# Patient Record
Sex: FEMALE | Race: WHITE | NOT HISPANIC OR LATINO | Employment: FULL TIME | URBAN - METROPOLITAN AREA
[De-identification: names, ages, dates, MRNs, and addresses within clinical notes are randomized per-mention and may not be internally consistent; named-entity substitution may affect disease eponyms.]

---
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
## 2019-05-14 NOTE — PATIENT INSTRUCTIONS
Birth control instructions:    1) Take at the same time every day.    2) The more times you miss a pill, the less effective the pill with be for contraception and managing periods.    3)  The pill will become effective at preventing pregnancy after 7 days of use.  Use a back up condom until then.    4)  Birth control does not protect you against sexually transmitted infections (STDS or STIS) so always use protection if not in a monogamous relationship.    5) Risks/side effects can include blood clots, high blood pressure, headaches, nausea (try taking with food if this occurs).  Do not smoke with this as this increases your blood clot risk.  If you become pregnant, stop taking the pill right away.    6) The first 3 months of being on birth control, you may have irregular periods or spotting.  Typically this will even out after your body adjusts.  If you have any questions or concerns, let me know.         Preventive Health Recommendations  Female Ages 18 to 20     Yearly exam:     See your health care provider every year in order to  o Review health changes.   o Discuss preventive care.    o Review your medicines if your doctor has prescribed any.      You should be tested each year for STDs (sexually transmitted diseases).       After age 20, talk to your provider about how often you should have cholesterol testing.      If you are at risk for diabetes, you should have a diabetes test (fasting glucose).     Shots:     Get a flu shot each year.     Get a tetanus shot every 10 years.     Consider getting the shot (vaccine) that prevents cervical cancer (Gardasil).    Nutrition:     Eat at least 5 servings of fruits and vegetables each day.    Eat whole-grain bread, whole-wheat pasta and brown rice instead of white grains and rice.    Get adequate Calcium and Vitamin D.     Lifestyle    Exercise at least 150 minutes a week each week (30 minutes a day, 5 days a week). This will help you control your weight and prevent  disease.    No smoking.     Wear sunscreen to prevent skin cancer.    See your dentist every six months for an exam and cleaning.

## 2019-05-14 NOTE — PROGRESS NOTES
SUBJECTIVE:   CC: Flaca Corona is an 18 year old woman who presents for preventive health visit.     Healthy Habits:    Answers for HPI/ROS submitted by the patient on 5/23/2019   Annual Exam:  Frequency of exercise:: 4-5 days/week  Getting at least 3 servings of Calcium per day:: Yes  Diet:: Regular (no restrictions)  Taking medications regularly:: Yes  Medication side effects:: None  Bi-annual eye exam:: Yes  Dental care twice a year:: Yes  Sleep apnea or symptoms of sleep apnea:: None  abdominal pain: No  Blood in stool: No  Blood in urine: No  chest pain: No  chills: No  congestion: No  constipation: No  cough: No  diarrhea: No  dizziness: No  ear pain: No  eye pain: No  nervous/anxious: No  fever: No  frequency: No  genital sores: No  headaches: No  hearing loss: No  heartburn: No  arthralgias: No  joint swelling: No  peripheral edema: No  mood changes: No  myalgias: No  nausea: No  dysuria: No  palpitations: No  Skin sensation changes: No  sore throat: No  urgency: No  rash: No  shortness of breath: No  visual disturbance: No  weakness: No  pelvic pain: No  vaginal bleeding: No  vaginal discharge: No  tenderness: No  breast mass: No  breast discharge: No  Additional concerns today:: No  Duration of exercise:: 45-60 minutes    Discuss getting on a BC med per pt have not been on meds in the past, looking more for pill version or form.  Has irregular periods.  Not too heavy or much cramping.   Not currently sexually active. Has been in the past. Due for std screening. Due for pap in the fall.     SH-nonsmoker. Going to be an . Working for the summer at a dance studio.       Today's PHQ-2 Score:   PHQ-2 ( 1999 Pfizer) 5/23/2019   Q1: Little interest or pleasure in doing things 0   Q2: Feeling down, depressed or hopeless 0   PHQ-2 Score 0   Q1: Little interest or pleasure in doing things Not at all   Q2: Feeling down, depressed or hopeless Not at all   PHQ-2 Score 0       Abuse: Current or  Past(Physical, Sexual or Emotional)- No  Do you feel safe in your environment? Yes    Social History     Tobacco Use     Smoking status: Never Smoker     Smokeless tobacco: Never Used   Substance Use Topics     Alcohol use: No     If you drink alcohol do you typically have >3 drinks per day or >7 drinks per week? No                     Reviewed orders with patient.  Reviewed health maintenance and updated orders accordingly - Yes  Lab work is in process  Labs reviewed in EPIC  BP Readings from Last 3 Encounters:   05/23/19 130/78   03/30/17 127/72   06/15/11 116/70 (92 %/ 79 %)*     *BP percentiles are based on the August 2017 AAP Clinical Practice Guideline for girls    Wt Readings from Last 3 Encounters:   05/23/19 49.4 kg (109 lb)   03/30/17 49.4 kg (109 lb) (17 %)*   06/15/11 29.9 kg (66 lb) (<1 %)*     * Growth percentiles are based on [facility] (Girls, 2-20 Years) data.                  Patient Active Problem List   Diagnosis     Left knee pain     Sprain of MCL (medial collateral ligament) of knee     Patellofemoral joint pain, left     History reviewed. No pertinent surgical history.    Social History     Tobacco Use     Smoking status: Never Smoker     Smokeless tobacco: Never Used   Substance Use Topics     Alcohol use: No     Family History   Problem Relation Age of Onset     Asthma Paternal Grandmother          Current Outpatient Medications   Medication Sig Dispense Refill     IBUPROFEN PO Take 200 mg by mouth       NO ACTIVE MEDICATIONS Reported on 3/30/2017       norethindrone-ethinyl estradiol (JUNEL FE 1/20) 1-20 MG-MCG tablet Take 1 tablet by mouth daily 28 tablet 11       Mammogram not appropriate for this patient based on age.    Pertinent mammograms are reviewed under the imaging tab.  History of abnormal Pap smear: NO - under age 21, PAP not appropriate for age     Reviewed and updated as needed this visit by clinical staff  Tobacco  Allergies  Meds  Med Hx  Surg Hx  Fam Hx  Soc Hx     "    Reviewed and updated as needed this visit by Provider        History reviewed. No pertinent past medical history.   History reviewed. No pertinent surgical history.  OB History   No data available       ROS:  CONSTITUTIONAL: NEGATIVE for fever, chills, change in weight  INTEGUMENTARU/SKIN: NEGATIVE for worrisome rashes, moles or lesions  EYES: NEGATIVE for vision changes or irritation  ENT: NEGATIVE for ear, mouth and throat problems  RESP: NEGATIVE for significant cough or SOB  BREAST: NEGATIVE for masses, tenderness or discharge  CV: NEGATIVE for chest pain, palpitations or peripheral edema  GI: NEGATIVE for nausea, abdominal pain, heartburn, or change in bowel habits  MUSCULOSKELETAL: NEGATIVE for significant arthralgias or myalgia  NEURO: NEGATIVE for weakness, dizziness or paresthesias  PSYCHIATRIC: NEGATIVE for changes in mood or affect    OBJECTIVE:   /78   Pulse 79   Temp 97.8  F (36.6  C) (Oral)   Resp 14   Ht 1.549 m (5' 1\")   Wt 49.4 kg (109 lb)   LMP 03/28/2019 (Approximate)   SpO2 99%   Breastfeeding? No   BMI 20.60 kg/m    EXAM:  GENERAL: healthy, alert and no distress  EYES: Eyes grossly normal to inspection, PERRL and conjunctivae and sclerae normal  HENT: ear canals and TM's normal, nose and mouth without ulcers or lesions  NECK: no adenopathy, no asymmetry, masses, or scars and thyroid normal to palpation  RESP: lungs clear to auscultation - no rales, rhonchi or wheezes  BREAST: normal without masses, tenderness or nipple discharge and no palpable axillary masses or adenopathy  CV: regular rate and rhythm, normal S1 S2, no S3 or S4, no murmur, click or rub, no peripheral edema and peripheral pulses strong  ABDOMEN: soft, nontender, no hepatosplenomegaly, no masses and bowel sounds normal  MS: no gross musculoskeletal defects noted, no edema  SKIN: no suspicious lesions or rashes  NEURO: Normal strength and tone, mentation intact and speech normal  PSYCH: mentation appears normal, " "affect normal/bright        ASSESSMENT/PLAN:   1. Screen for STD (sexually transmitted disease)    - Chlamydia trachomatis PCR    2. Irregular periods    - norethindrone-ethinyl estradiol (JUNEL FE 1/20) 1-20 MG-MCG tablet; Take 1 tablet by mouth daily  Dispense: 28 tablet; Refill: 11    3. Encounter for routine adult health examination without abnormal findings        COUNSELING:   Reviewed preventive health counseling, as reflected in patient instructions       Regular exercise       Healthy diet/nutrition       Vision screening       Hearing screening    Estimated body mass index is 20.6 kg/m  as calculated from the following:    Height as of this encounter: 1.549 m (5' 1\").    Weight as of this encounter: 49.4 kg (109 lb).         reports that she has never smoked. She has never used smokeless tobacco.     Patient Instructions   Birth control instructions:    1) Take at the same time every day.    2) The more times you miss a pill, the less effective the pill with be for contraception and managing periods.    3)  The pill will become effective at preventing pregnancy after 7 days of use.  Use a back up condom until then.    4)  Birth control does not protect you against sexually transmitted infections (STDS or STIS) so always use protection if not in a monogamous relationship.    5) Risks/side effects can include blood clots, high blood pressure, headaches, nausea (try taking with food if this occurs).  Do not smoke with this as this increases your blood clot risk.  If you become pregnant, stop taking the pill right away.    6) The first 3 months of being on birth control, you may have irregular periods or spotting.  Typically this will even out after your body adjusts.  If you have any questions or concerns, let me know.         Preventive Health Recommendations  Female Ages 18 to 20     Yearly exam:     See your health care provider every year in order to  o Review health changes.   o Discuss preventive care.  "   o Review your medicines if your doctor has prescribed any.      You should be tested each year for STDs (sexually transmitted diseases).       After age 20, talk to your provider about how often you should have cholesterol testing.      If you are at risk for diabetes, you should have a diabetes test (fasting glucose).     Shots:     Get a flu shot each year.     Get a tetanus shot every 10 years.     Consider getting the shot (vaccine) that prevents cervical cancer (Gardasil).    Nutrition:     Eat at least 5 servings of fruits and vegetables each day.    Eat whole-grain bread, whole-wheat pasta and brown rice instead of white grains and rice.    Get adequate Calcium and Vitamin D.     Lifestyle    Exercise at least 150 minutes a week each week (30 minutes a day, 5 days a week). This will help you control your weight and prevent disease.    No smoking.     Wear sunscreen to prevent skin cancer.    See your dentist every six months for an exam and cleaning.          Counseling Resources:  ATP IV Guidelines  Pooled Cohorts Equation Calculator  Breast Cancer Risk Calculator  FRAX Risk Assessment  ICSI Preventive Guidelines  Dietary Guidelines for Americans, 2010  USDA's MyPlate  ASA Prophylaxis  Lung CA Screening    Sarah Johnson PA-C  [facility]

## 2019-05-23 ENCOUNTER — OFFICE VISIT (OUTPATIENT)
Dept: FAMILY MEDICINE | Facility: CLINIC | Age: 19
End: 2019-05-23
Payer: COMMERCIAL

## 2019-05-23 VITALS
TEMPERATURE: 97.8 F | RESPIRATION RATE: 14 BRPM | WEIGHT: 109 LBS | SYSTOLIC BLOOD PRESSURE: 130 MMHG | BODY MASS INDEX: 20.58 KG/M2 | OXYGEN SATURATION: 99 % | HEART RATE: 79 BPM | HEIGHT: 61 IN | DIASTOLIC BLOOD PRESSURE: 78 MMHG

## 2019-05-23 DIAGNOSIS — Z11.3 SCREEN FOR STD (SEXUALLY TRANSMITTED DISEASE): ICD-10-CM

## 2019-05-23 DIAGNOSIS — Z00.00 ENCOUNTER FOR ROUTINE ADULT HEALTH EXAMINATION WITHOUT ABNORMAL FINDINGS: Primary | ICD-10-CM

## 2019-05-23 DIAGNOSIS — N92.6 IRREGULAR PERIODS: ICD-10-CM

## 2019-05-23 PROCEDURE — 99395 PREV VISIT EST AGE 18-39: CPT | Performed by: PHYSICIAN ASSISTANT

## 2019-05-23 PROCEDURE — 87491 CHLMYD TRACH DNA AMP PROBE: CPT | Performed by: PHYSICIAN ASSISTANT

## 2019-05-23 RX ORDER — NORETHINDRONE ACETATE AND ETHINYL ESTRADIOL 1MG-20(21)
1 KIT ORAL DAILY
Qty: 28 TABLET | Refills: 11 | Status: SHIPPED | OUTPATIENT
Start: 2019-05-23 | End: 2020-03-30

## 2019-05-23 ASSESSMENT — ENCOUNTER SYMPTOMS
NERVOUS/ANXIOUS: 0
COUGH: 0
HEMATURIA: 0
DIZZINESS: 0
MYALGIAS: 0
HEMATOCHEZIA: 0
WEAKNESS: 0
DYSURIA: 0
HEADACHES: 0
DIARRHEA: 0
PALPITATIONS: 0
SHORTNESS OF BREATH: 0
CHILLS: 0
SORE THROAT: 0
BREAST MASS: 0
CONSTIPATION: 0
PARESTHESIAS: 0
FEVER: 0
HEARTBURN: 0
ARTHRALGIAS: 0
EYE PAIN: 0
JOINT SWELLING: 0
FREQUENCY: 0
NAUSEA: 0
ABDOMINAL PAIN: 0

## 2019-05-23 ASSESSMENT — MIFFLIN-ST. JEOR: SCORE: 1201.8

## 2019-05-23 NOTE — LETTER
May 28, 2019    Flaca Corona  [address] [facility] [address]        Dear Flaca,    It was a pleasure to see you at your recent office visit.  Your test results are listed below. Negative chlamydia test.         If you have any questions or concerns, please call the clinic at [phone].    Sincerely,  Sarah Johnson PA-C    Results for orders placed or performed in visit on 05/23/19   Chlamydia trachomatis PCR   Result Value Ref Range    Specimen Description Urine     Chlamydia Trachomatis PCR Negative NEG^Negative

## 2019-05-24 LAB
C TRACH DNA SPEC QL NAA+PROBE: NEGATIVE
SPECIMEN SOURCE: NORMAL

## 2019-05-27 NOTE — RESULT ENCOUNTER NOTE
Dear Flaca,      It was a pleasure to see you at your recent office visit.  Your test results are listed below. Negative chlamydia test.         If you have any questions or concerns, please call the clinic at [phone].    Sincerely,  Sarah Johnson PA-C

## 2020-03-30 ENCOUNTER — MYC REFILL (OUTPATIENT)
Dept: FAMILY MEDICINE | Facility: CLINIC | Age: 20
End: 2020-03-30

## 2020-03-30 DIAGNOSIS — N92.6 IRREGULAR PERIODS: ICD-10-CM

## 2020-03-31 RX ORDER — NORETHINDRONE ACETATE AND ETHINYL ESTRADIOL 1MG-20(21)
1 KIT ORAL DAILY
Qty: 84 TABLET | Refills: 0 | Status: SHIPPED | OUTPATIENT
Start: 2020-03-31 | End: 2020-07-06

## 2020-03-31 NOTE — TELEPHONE ENCOUNTER
"Requested Prescriptions   Signed Prescriptions Disp Refills    norethindrone-ethinyl estradiol (JUNEL FE 1/20) 1-20 MG-MCG tablet 84 tablet 0     Sig: Take 1 tablet by mouth daily       Contraceptives Protocol Passed - 3/30/2020 11:47 PM        Passed - Patient is not a current smoker if age is 35 or older        Passed - Recent (12 mo) or future (30 days) visit within the authorizing provider's specialty     Patient has had an office visit with the authorizing provider or a provider within the authorizing providers department within the previous 12 mos or has a future within next 30 days. See \"Patient Info\" tab in inbasket, or \"Choose Columns\" in Meds & Orders section of the refill encounter.              Passed - Medication is active on med list        Passed - No active pregnancy on record        Passed - No positive pregnancy test in past 12 months                 "

## 2020-07-02 NOTE — PROGRESS NOTES
"Subjective     Flaca Corona is a 19 year old female who presents to clinic today for the following health issues:    HPI     PAP and refill BC medication.  Due for first pap and std screening also due.   Takes junel for birth control. Periods regular. No side effects. No concerns.   Has had a breast exam before. No concerns.   Has been sexually active in the past.   No vaginal discharge.            Patient Active Problem List   Diagnosis     Left knee pain     Sprain of MCL (medial collateral ligament) of knee     Patellofemoral joint pain, left     No past surgical history on file.    Social History     Tobacco Use     Smoking status: Never Smoker     Smokeless tobacco: Never Used   Substance Use Topics     Alcohol use: No     Family History   Problem Relation Age of Onset     Asthma Paternal Grandmother          Current Outpatient Medications   Medication Sig Dispense Refill     norethindrone-ethinyl estradiol (JUNEL FE 1/20) 1-20 MG-MCG tablet Take 1 tablet by mouth daily 84 tablet 0     IBUPROFEN PO Take 200 mg by mouth       NO ACTIVE MEDICATIONS Reported on 3/30/2017       No Known Allergies      Reviewed and updated as needed this visit by Provider         Review of Systems   Constitutional, HEENT, cardiovascular, pulmonary, GI, , musculoskeletal, neuro, skin, endocrine and psych systems are negative, except as otherwise noted.      Objective    /70   Pulse 106   Temp 99.1  F (37.3  C) (Tympanic)   Ht 1.549 m (5' 1\")   Wt 48.1 kg (106 lb)   LMP 06/17/2020 (Exact Date)   SpO2 99%   BMI 20.03 kg/m    Body mass index is 20.03 kg/m .  Physical Exam   GENERAL: healthy, alert and no distress  NECK: no adenopathy, no asymmetry, masses, or scars and thyroid normal to palpation  RESP: lungs clear to auscultation - no rales, rhonchi or wheezes  BREAST: normal without masses, tenderness or nipple discharge and no palpable axillary masses or adenopathy  CV: regular rate and rhythm, normal S1 S2, no S3 " or S4, no murmur, click or rub, no peripheral edema and peripheral pulses strong   (female): normal female external genitalia, normal urethral meatus, vaginal mucosa, normal cervix/adnexa/uterus without masses or discharge  MS: no gross musculoskeletal defects noted, no edema  NEURO: Normal strength and tone, mentation intact and speech normal  PSYCH: mentation appears normal, affect normal/bright    Diagnostic Test Results:  Labs reviewed in Epic        Assessment & Plan     1. Screening for malignant neoplasm of cervix    - Pap imaged thin layer screen only - recommended age 21 - 24 years    2. Screening for STDs (sexually transmitted diseases)    - NEISSERIA GONORRHOEA PCR  - CHLAMYDIA TRACHOMATIS PCR    3. Encounter for surveillance of contraceptive pills      4. Irregular periods  Doing well    - norethindrone-ethinyl estradiol (JUNEL FE 1/20) 1-20 MG-MCG tablet; Take 1 tablet by mouth daily  Dispense: 84 tablet; Refill: 3           Return in about 1 year (around 7/6/2021) for Physical Exam, fasting Lab Work.    Sarah Johnson PA-C  [facility]

## 2020-07-06 ENCOUNTER — OFFICE VISIT (OUTPATIENT)
Dept: FAMILY MEDICINE | Facility: CLINIC | Age: 20
End: 2020-07-06
Payer: COMMERCIAL

## 2020-07-06 VITALS
OXYGEN SATURATION: 99 % | BODY MASS INDEX: 20.01 KG/M2 | HEIGHT: 61 IN | TEMPERATURE: 99.1 F | HEART RATE: 95 BPM | DIASTOLIC BLOOD PRESSURE: 70 MMHG | WEIGHT: 106 LBS | SYSTOLIC BLOOD PRESSURE: 120 MMHG

## 2020-07-06 DIAGNOSIS — Z30.41 ENCOUNTER FOR SURVEILLANCE OF CONTRACEPTIVE PILLS: ICD-10-CM

## 2020-07-06 DIAGNOSIS — Z11.3 SCREENING FOR STDS (SEXUALLY TRANSMITTED DISEASES): ICD-10-CM

## 2020-07-06 DIAGNOSIS — N92.6 IRREGULAR PERIODS: ICD-10-CM

## 2020-07-06 DIAGNOSIS — Z12.4 SCREENING FOR MALIGNANT NEOPLASM OF CERVIX: Primary | ICD-10-CM

## 2020-07-06 PROCEDURE — 87591 N.GONORRHOEAE DNA AMP PROB: CPT | Performed by: PHYSICIAN ASSISTANT

## 2020-07-06 PROCEDURE — 87491 CHLMYD TRACH DNA AMP PROBE: CPT | Performed by: PHYSICIAN ASSISTANT

## 2020-07-06 PROCEDURE — G0145 SCR C/V CYTO,THINLAYER,RESCR: HCPCS | Performed by: PHYSICIAN ASSISTANT

## 2020-07-06 PROCEDURE — 99214 OFFICE O/P EST MOD 30 MIN: CPT | Performed by: PHYSICIAN ASSISTANT

## 2020-07-06 RX ORDER — NORETHINDRONE ACETATE AND ETHINYL ESTRADIOL 1MG-20(21)
1 KIT ORAL DAILY
Qty: 84 TABLET | Refills: 3 | Status: SHIPPED | OUTPATIENT
Start: 2020-07-06 | End: 2021-07-12

## 2020-07-06 ASSESSMENT — MIFFLIN-ST. JEOR: SCORE: 1183.19

## 2020-07-06 NOTE — NURSING NOTE
"Chief Complaint   Patient presents with     Gyn Exam     PAP needed     Contraception     Refill BC med      Health Maintenance     orders pended       Initial /70   Pulse 106   Temp 99.1  F (37.3  C) (Tympanic)   Ht 1.549 m (5' 1\")   Wt 48.1 kg (106 lb)   LMP 06/17/2020 (Exact Date)   SpO2 99%   BMI 20.03 kg/m   Estimated body mass index is 20.03 kg/m  as calculated from the following:    Height as of this encounter: 1.549 m (5' 1\").    Weight as of this encounter: 48.1 kg (106 lb).    Norma Medrano, CMA    "

## 2020-07-07 LAB
C TRACH DNA SPEC QL NAA+PROBE: NEGATIVE
N GONORRHOEA DNA SPEC QL NAA+PROBE: NEGATIVE
SPECIMEN SOURCE: NORMAL
SPECIMEN SOURCE: NORMAL

## 2020-07-07 NOTE — RESULT ENCOUNTER NOTE
Lacy Thayer,       Your recent test results are attached, if you have any questions or concerns please feel free to contact me via e-mail or call [phone].  STD testing negative. No chlamydia or gonorrhea. Always use condoms to prevent STDs. Re-check in one year or after changing sexual partners.          It was a pleasure to see you at your recent office visit.      Sincerely,  Sarah Johnson PA-C

## 2020-07-08 LAB
COPATH REPORT: NORMAL
PAP: NORMAL

## 2020-12-06 ENCOUNTER — HEALTH MAINTENANCE LETTER (OUTPATIENT)
Age: 20
End: 2020-12-06

## 2021-07-10 DIAGNOSIS — N92.6 IRREGULAR PERIODS: ICD-10-CM

## 2021-07-12 RX ORDER — NORETHINDRONE ACETATE/ETHINYL ESTRADIOL AND FERROUS FUMARATE 1MG-20(21)
KIT ORAL
Qty: 84 TABLET | Refills: 0 | Status: SHIPPED | OUTPATIENT
Start: 2021-07-12 | End: 2021-09-24

## 2021-07-12 NOTE — TELEPHONE ENCOUNTER
Prescription approved per [facility] Refill Protocol.  APPT NEEDED FOR FURTHER REFILLS  Ju refill given per RN protocol.   Due for office visit  Pharmacy note to inform pt of office visit needed for continued medication use  Viviane Villa RN

## 2021-08-14 ENCOUNTER — MYC MEDICAL ADVICE (OUTPATIENT)
Dept: FAMILY MEDICINE | Facility: CLINIC | Age: 21
End: 2021-08-14

## 2021-09-16 NOTE — PROGRESS NOTES
"    Assessment & Plan     Irregular periods  improved  - norethindrone-ethinyl estradiol (LUCY FE 1/20) 1-20 MG-MCG tablet; Take 1 tablet by mouth daily    Screen for STD (sexually transmitted disease)    - NEISSERIA GONORRHOEA PCR; Future  - CHLAMYDIA TRACHOMATIS PCR; Future  - NEISSERIA GONORRHOEA PCR  - CHLAMYDIA TRACHOMATIS PCR    Rash and nonspecific skin eruption  Discussed normal reaction to vaccination  Discussed reasons to f/u or  be seen  Should improve over next few days  Ice if desired        No follow-ups on file.    KATHY Martin [facility]    Charles Thayer is a 20 year old who presents for the following health issues  accompanied by her Self:    HPI     Chlamydia test is due per pt, ok to have completed.    BC med check.  Doing well on pill. Not getting periods anymore, they were light  before. Discussed this can be normal no pill.       Graduated college last summer. 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     Got covid vaccine number two on Tuesday. Has local reaction she would like me to look at.  Redness around area.   No rashes elsewhere. No systemic symptoms.           Review of Systems   Constitutional, HEENT, cardiovascular, pulmonary, GI, , musculoskeletal, neuro, skin, endocrine and psych systems are negative, except as otherwise noted.      Objective    /77   Pulse 95   Temp 98.1  F (36.7  C) (Tympanic)   Resp 14   Ht 1.549 m (5' 1\")   Wt 47.6 kg (105 lb)   SpO2 99%   Breastfeeding No   BMI 19.84 kg/m    Body mass index is 19.84 kg/m .  Physical Exam   GENERAL: healthy, alert and no distress  RESP: lungs clear to auscultation - no rales, rhonchi or wheezes  CV: regular rate and rhythm, normal S1 S2, no S3 or S4, no murmur, click or rub, no peripheral edema and peripheral pulses strong  MS: no gross musculoskeletal defects noted, no edema  NEURO: Normal strength and tone, mentation intact and speech normal  PSYCH: mentation " appears normal, affect normal/bright  Skin: left upper arm-around vaccination location there is a small area of pink and tenderness. Appears to be a normal vaccine reaction. Does not appear cellulitic

## 2021-09-24 ENCOUNTER — OFFICE VISIT (OUTPATIENT)
Dept: FAMILY MEDICINE | Facility: CLINIC | Age: 21
End: 2021-09-24
Payer: COMMERCIAL

## 2021-09-24 VITALS
OXYGEN SATURATION: 99 % | BODY MASS INDEX: 19.83 KG/M2 | RESPIRATION RATE: 14 BRPM | SYSTOLIC BLOOD PRESSURE: 129 MMHG | WEIGHT: 105 LBS | HEART RATE: 95 BPM | HEIGHT: 61 IN | DIASTOLIC BLOOD PRESSURE: 77 MMHG | TEMPERATURE: 98.1 F

## 2021-09-24 DIAGNOSIS — N92.6 IRREGULAR PERIODS: Primary | ICD-10-CM

## 2021-09-24 DIAGNOSIS — Z11.3 SCREEN FOR STD (SEXUALLY TRANSMITTED DISEASE): ICD-10-CM

## 2021-09-24 DIAGNOSIS — R21 RASH AND NONSPECIFIC SKIN ERUPTION: ICD-10-CM

## 2021-09-24 PROCEDURE — 87491 CHLMYD TRACH DNA AMP PROBE: CPT | Performed by: PHYSICIAN ASSISTANT

## 2021-09-24 PROCEDURE — 87591 N.GONORRHOEAE DNA AMP PROB: CPT | Performed by: PHYSICIAN ASSISTANT

## 2021-09-24 PROCEDURE — 99213 OFFICE O/P EST LOW 20 MIN: CPT | Performed by: PHYSICIAN ASSISTANT

## 2021-09-24 RX ORDER — NORETHINDRONE ACETATE AND ETHINYL ESTRADIOL 1MG-20(21)
1 KIT ORAL DAILY
Qty: 84 TABLET | Refills: 3 | Status: SHIPPED | OUTPATIENT
Start: 2021-09-24 | End: 2022-09-06

## 2021-09-24 ASSESSMENT — MIFFLIN-ST. JEOR: SCORE: 1173.66

## 2021-09-26 ENCOUNTER — HEALTH MAINTENANCE LETTER (OUTPATIENT)
Age: 21
End: 2021-09-26

## 2021-09-26 LAB
C TRACH DNA SPEC QL NAA+PROBE: NEGATIVE
N GONORRHOEA DNA SPEC QL NAA+PROBE: NEGATIVE

## 2021-09-27 NOTE — RESULT ENCOUNTER NOTE
Lacy Thayer,       Your recent test results are attached, if you have any questions or concerns please feel free to contact me via e-mail or call [phone]. STD testing negative. No chlamydia or gonorrhea.          It was a pleasure to see you at your recent office visit.      Sincerely,  Sarah Johnson PA-C

## 2022-09-08 NOTE — PROGRESS NOTES
SUBJECTIVE:   CC: Flaca Corona is an 21 year old woman who presents for preventive health visit.     Patient has been advised of split billing requirements and indicates understanding: Yes     Pt is not fasting and did not have labs completed  Never had fasting labs. Will get next year.     Chlamydia test is due.    History of Present Illness       Reason for visit:  Need annual physical to renew prescription    She eats 2-3 servings of fruits and vegetables daily.She consumes 0 sweetened beverage(s) daily.She exercises with enough effort to increase her heart rate 30 to 60 minutes per day.  She exercises with enough effort to increase her heart rate 4 days per week.   She is taking medications regularly.  She is not taking prescribed medications regularly due to Not applicable.  Healthy Habits:     Getting at least 3 servings of Calcium per day:  Yes    Bi-annual eye exam:  Yes    Dental care twice a year:  Yes    Sleep apnea or symptoms of sleep apnea:  None    Diet:  Regular (no restrictions)    Frequency of exercise:  None    Duration of exercise:  N/A    Taking medications regularly:  0    Barriers to taking medications:  Not applicable    Medication side effects:  Not applicable    PHQ-2 Total Score: 0    Additional concerns today:  Yes        BC med check.  Doing well on pill. No concerns.      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     Due for pap next year.       Today's PHQ-2 Score:   PHQ-2 ( 1999 Pfizer) 9/8/2022   Q1: Little interest or pleasure in doing things 0   Q2: Feeling down, depressed or hopeless 0   PHQ-2 Score 0   PHQ-2 Total Score (12-17 Years)- Positive if 3 or more points; Administer PHQ-A if positive -   Q1: Little interest or pleasure in doing things Not at all   Q2: Feeling down, depressed or hopeless Not at all   PHQ-2 Score 0       Abuse: Current or Past (Physical, Sexual or Emotional) - No  Do you feel safe in your environment? Yes    Have you ever done Advance Care  "Planning? (For example, a Health Directive, POLST, or a discussion with a medical provider or your loved ones about your wishes): No, advance care planning information given to patient to review.  Patient declined advance care planning discussion at this time.    Social History     Tobacco Use     Smoking status: Never Smoker     Smokeless tobacco: Never Used   Substance Use Topics     Alcohol use: Yes     Comment: Occasionally     If you drink alcohol do you typically have >3 drinks per day or >7 drinks per week? No    No flowsheet data found.    Reviewed orders with patient.  Reviewed health maintenance and updated orders accordingly - Yes      Breast Cancer Screening:        History of abnormal Pap smear: NO - age 21-29 PAP every 3 years recommended  PAP / HPV 7/6/2020   PAP (Historical) NIL     Reviewed and updated as needed this visit by clinical staff   Tobacco  Allergies  Meds                Reviewed and updated as needed this visit by Provider                   No past medical history on file.   No past surgical history on file.  OB History   No obstetric history on file.       Review of Systems   Constitutional: Negative.    HENT: Negative.    Eyes: Negative.    Respiratory: Negative.    Cardiovascular: Negative.    Gastrointestinal: Negative.    Endocrine: Negative.    Breasts:  negative.    Genitourinary: Negative.    Musculoskeletal: Negative.    Skin: Negative.    Allergic/Immunologic: Negative.    Neurological: Negative.    Hematological: Negative.    Psychiatric/Behavioral: Negative.           OBJECTIVE:   /76   Pulse 91   Temp 98.2  F (36.8  C) (Tympanic)   Resp 14   Ht 1.549 m (5' 1\")   Wt 47.6 kg (105 lb)   LMP  (LMP Unknown)   SpO2 99%   Breastfeeding No   BMI 19.84 kg/m    Physical Exam  GENERAL: healthy, alert and no distress  EYES: Eyes grossly normal to inspection, PERRL and conjunctivae and sclerae normal  HENT: ear canals and TM's normal, nose and mouth without ulcers or " "lesions  NECK: no adenopathy, no asymmetry, masses, or scars and thyroid normal to palpation  RESP: lungs clear to auscultation - no rales, rhonchi or wheezes  BREAST: normal without masses, tenderness or nipple discharge and no palpable axillary masses or adenopathy  CV: regular rate and rhythm, normal S1 S2, no S3 or S4, no murmur, click or rub, no peripheral edema and peripheral pulses strong  ABDOMEN: soft, nontender, no hepatosplenomegaly, no masses and bowel sounds normal  MS: no gross musculoskeletal defects noted, no edema  SKIN: no suspicious lesions or rashes  NEURO: Normal strength and tone, mentation intact and speech normal  PSYCH: mentation appears normal, affect normal/bright        ASSESSMENT/PLAN:   (Z00.00) Routine general medical examination at a health care facility  (primary encounter diagnosis)  Comment:   Plan:     (Z11.3) Screening for STDs (sexually transmitted diseases)  Comment:   Plan: NEISSERIA GONORRHOEA PCR, CHLAMYDIA TRACHOMATIS        PCR            (N92.6) Irregular periods  Comment: doing well  Plan: norethindrone-ethinyl estradiol (JUNEL FE 1/20)        1-20 MG-MCG tablet              Patient has been advised of split billing requirements and indicates understanding: Yes    COUNSELING:  Reviewed preventive health counseling, as reflected in patient instructions       Regular exercise       Healthy diet/nutrition    Estimated body mass index is 19.84 kg/m  as calculated from the following:    Height as of this encounter: 1.549 m (5' 1\").    Weight as of this encounter: 47.6 kg (105 lb).        She reports that she has never smoked. She has never used smokeless tobacco.      Counseling Resources:  ATP IV Guidelines  Pooled Cohorts Equation Calculator  Breast Cancer Risk Calculator  BRCA-Related Cancer Risk Assessment: FHS-7 Tool  FRAX Risk Assessment  ICSI Preventive Guidelines  Dietary Guidelines for Americans, 2010  USDA's MyPlate  ASA Prophylaxis  Lung CA Screening    Sarah " Rudi Johnson PA-C  [facility]

## 2022-09-09 ENCOUNTER — OFFICE VISIT (OUTPATIENT)
Dept: FAMILY MEDICINE | Facility: CLINIC | Age: 22
End: 2022-09-09
Payer: COMMERCIAL

## 2022-09-09 VITALS
SYSTOLIC BLOOD PRESSURE: 128 MMHG | OXYGEN SATURATION: 99 % | HEIGHT: 61 IN | TEMPERATURE: 98.2 F | HEART RATE: 91 BPM | RESPIRATION RATE: 14 BRPM | DIASTOLIC BLOOD PRESSURE: 76 MMHG | WEIGHT: 105 LBS | BODY MASS INDEX: 19.83 KG/M2

## 2022-09-09 DIAGNOSIS — Z00.00 ROUTINE GENERAL MEDICAL EXAMINATION AT A HEALTH CARE FACILITY: Primary | ICD-10-CM

## 2022-09-09 DIAGNOSIS — Z11.3 SCREENING FOR STDS (SEXUALLY TRANSMITTED DISEASES): ICD-10-CM

## 2022-09-09 DIAGNOSIS — N92.6 IRREGULAR PERIODS: ICD-10-CM

## 2022-09-09 PROBLEM — M25.562 PATELLOFEMORAL JOINT PAIN, LEFT: Status: RESOLVED | Noted: 2017-04-04 | Resolved: 2022-09-09

## 2022-09-09 PROBLEM — S83.419A SPRAIN OF MCL (MEDIAL COLLATERAL LIGAMENT) OF KNEE: Status: RESOLVED | Noted: 2017-04-04 | Resolved: 2022-09-09

## 2022-09-09 PROBLEM — M25.562 LEFT KNEE PAIN: Status: RESOLVED | Noted: 2017-04-04 | Resolved: 2022-09-09

## 2022-09-09 PROCEDURE — 87491 CHLMYD TRACH DNA AMP PROBE: CPT | Performed by: PHYSICIAN ASSISTANT

## 2022-09-09 PROCEDURE — 99395 PREV VISIT EST AGE 18-39: CPT | Performed by: PHYSICIAN ASSISTANT

## 2022-09-09 PROCEDURE — 87591 N.GONORRHOEAE DNA AMP PROB: CPT | Performed by: PHYSICIAN ASSISTANT

## 2022-09-09 RX ORDER — NORETHINDRONE ACETATE AND ETHINYL ESTRADIOL 1MG-20(21)
1 KIT ORAL DAILY
Qty: 84 TABLET | Refills: 3 | Status: SHIPPED | OUTPATIENT
Start: 2022-09-09 | End: 2023-11-02

## 2022-09-09 ASSESSMENT — ENCOUNTER SYMPTOMS
MUSCULOSKELETAL NEGATIVE: 1
EYES NEGATIVE: 1
ALLERGIC/IMMUNOLOGIC NEGATIVE: 1
CARDIOVASCULAR NEGATIVE: 1
RESPIRATORY NEGATIVE: 1
ENDOCRINE NEGATIVE: 1
NEUROLOGICAL NEGATIVE: 1
GASTROINTESTINAL NEGATIVE: 1
PSYCHIATRIC NEGATIVE: 1
CONSTITUTIONAL NEGATIVE: 1
HEMATOLOGIC/LYMPHATIC NEGATIVE: 1

## 2022-09-10 LAB
C TRACH DNA SPEC QL NAA+PROBE: NEGATIVE
N GONORRHOEA DNA SPEC QL NAA+PROBE: NEGATIVE

## 2022-09-13 NOTE — RESULT ENCOUNTER NOTE
Lacy Thayer,       Your recent test results are attached, if you have any questions or concerns please feel free to contact me via e-mail or call [phone].  STD testing negative. No chlamydia or gonorrhea. Always use condoms to prevent STDs. Re-check in one year or after changing sexual partners.          It was a pleasure to see you at your recent office visit.      Sincerely,  Sarah Johnson PA-C

## 2023-11-01 ASSESSMENT — ENCOUNTER SYMPTOMS
COUGH: 0
CHILLS: 0
EYE PAIN: 0
FREQUENCY: 0
WEAKNESS: 0
HEARTBURN: 0
MYALGIAS: 0
SORE THROAT: 0
HEMATURIA: 0
SHORTNESS OF BREATH: 0
NERVOUS/ANXIOUS: 0
DIZZINESS: 0
NAUSEA: 0
HEADACHES: 0
ABDOMINAL PAIN: 0
BREAST MASS: 0
PARESTHESIAS: 0
FEVER: 0
CONSTIPATION: 0
DYSURIA: 0
JOINT SWELLING: 0
DIARRHEA: 0
HEMATOCHEZIA: 0
ARTHRALGIAS: 0
PALPITATIONS: 0

## 2023-11-02 ENCOUNTER — OFFICE VISIT (OUTPATIENT)
Dept: FAMILY MEDICINE | Facility: CLINIC | Age: 23
End: 2023-11-02
Payer: COMMERCIAL

## 2023-11-02 VITALS
RESPIRATION RATE: 22 BRPM | TEMPERATURE: 98.8 F | DIASTOLIC BLOOD PRESSURE: 83 MMHG | BODY MASS INDEX: 19.83 KG/M2 | OXYGEN SATURATION: 100 % | WEIGHT: 105 LBS | HEIGHT: 61 IN | HEART RATE: 94 BPM | SYSTOLIC BLOOD PRESSURE: 125 MMHG

## 2023-11-02 DIAGNOSIS — Z00.00 ROUTINE GENERAL MEDICAL EXAMINATION AT A HEALTH CARE FACILITY: Primary | ICD-10-CM

## 2023-11-02 DIAGNOSIS — Z30.41 ENCOUNTER FOR SURVEILLANCE OF CONTRACEPTIVE PILLS: ICD-10-CM

## 2023-11-02 DIAGNOSIS — Z12.4 SCREENING FOR MALIGNANT NEOPLASM OF CERVIX: ICD-10-CM

## 2023-11-02 PROCEDURE — G0145 SCR C/V CYTO,THINLAYER,RESCR: HCPCS | Performed by: NURSE PRACTITIONER

## 2023-11-02 PROCEDURE — 99395 PREV VISIT EST AGE 18-39: CPT | Mod: 25 | Performed by: NURSE PRACTITIONER

## 2023-11-02 PROCEDURE — 90471 IMMUNIZATION ADMIN: CPT | Performed by: NURSE PRACTITIONER

## 2023-11-02 PROCEDURE — 90715 TDAP VACCINE 7 YRS/> IM: CPT | Performed by: NURSE PRACTITIONER

## 2023-11-02 RX ORDER — NORETHINDRONE ACETATE AND ETHINYL ESTRADIOL 1MG-20(21)
1 KIT ORAL DAILY
Qty: 84 TABLET | Refills: 3 | Status: SHIPPED | OUTPATIENT
Start: 2023-11-02 | End: 2024-09-27

## 2023-11-02 ASSESSMENT — ENCOUNTER SYMPTOMS
COUGH: 0
WEAKNESS: 0
FREQUENCY: 0
PALPITATIONS: 0
DIZZINESS: 0
HEARTBURN: 0
CHILLS: 0
PARESTHESIAS: 0
SHORTNESS OF BREATH: 0
DIARRHEA: 0
MYALGIAS: 0
EYE PAIN: 0
HEADACHES: 0
DYSURIA: 0
FEVER: 0
ABDOMINAL PAIN: 0
HEMATURIA: 0
SORE THROAT: 0
HEMATOCHEZIA: 0
NAUSEA: 0
JOINT SWELLING: 0
NERVOUS/ANXIOUS: 0
BREAST MASS: 0
ARTHRALGIAS: 0
CONSTIPATION: 0

## 2023-11-02 ASSESSMENT — PAIN SCALES - GENERAL: PAINLEVEL: NO PAIN (0)

## 2023-11-02 NOTE — NURSING NOTE
Prior to immunization administration, verified patients identity using patient s name and date of birth. Please see Immunization Activity for additional information.     Screening Questionnaire for Adult Immunization    Are you sick today?   No   Do you have allergies to medications, food, a vaccine component or latex?   No   Have you ever had a serious reaction after receiving a vaccination?   No   Do you have a long-term health problem with heart, lung, kidney, or metabolic disease (e.g., diabetes), asthma, a blood disorder, no spleen, complement component deficiency, a cochlear implant, or a spinal fluid leak?  Are you on long-term aspirin therapy?   No   Do you have cancer, leukemia, HIV/AIDS, or any other immune system problem?   No   Do you have a parent, brother, or sister with an immune system problem?   No   In the past 3 months, have you taken medications that affect  your immune system, such as prednisone, other steroids, or anticancer drugs; drugs for the treatment of rheumatoid arthritis, Crohn s disease, or psoriasis; or have you had radiation treatments?   No   Have you had a seizure, or a brain or other nervous system problem?   No   During the past year, have you received a transfusion of blood or blood    products, or been given immune (gamma) globulin or antiviral drug?   No   For women: Are you pregnant or is there a chance you could become       pregnant during the next month?   No   Have you received any vaccinations in the past 4 weeks?   No     Immunization questionnaire answers were all negative.      Patient instructed to remain in clinic for 15 minutes afterwards, and to report any adverse reactions.     Screening performed by ANGY WALTERS MA on 11/2/2023 at 8:22 AM.

## 2023-11-02 NOTE — PROGRESS NOTES
SUBJECTIVE:   CC: Flaca is an 23 year old who presents for preventive health visit.       11/2/2023     8:07 AM   Additional Questions   Roomed by Yael TALBERT   Accompanied by self       Patient here to establish care and for preventative health screening. In addition, they have the following concerns:     1. Refill birth control- on Junel for 4 years. No issues or side effects.     Recently engaged. Works as a CPA.       Healthy Habits:     Getting at least 3 servings of Calcium per day:  Yes    Bi-annual eye exam:  Yes    Dental care twice a year:  Yes    Sleep apnea or symptoms of sleep apnea:  None    Diet:  Regular (no restrictions)    Frequency of exercise:  2-3 days/week    Duration of exercise:  45-60 minutes    Taking medications regularly:  Yes    Medication side effects:  None    Additional concerns today:  No      Today's PHQ-2 Score:       11/1/2023     9:42 AM   PHQ-2 ( 1999 [employer])   Q1: Little interest or pleasure in doing things 0   Q2: Feeling down, depressed or hopeless 0   PHQ-2 Score 0   Q1: Little interest or pleasure in doing things Not at all   Q2: Feeling down, depressed or hopeless Not at all   PHQ-2 Score 0       Social History     Tobacco Use    Smoking status: Never    Smokeless tobacco: Never   Substance Use Topics    Alcohol use: Yes     Comment: Occasionally             11/1/2023     9:41 AM   Alcohol Use   Prescreen: >3 drinks/day or >7 drinks/week? No     Reviewed orders with patient.  Reviewed health maintenance and updated orders accordingly - Yes      Breast Cancer Screening:    FHS-7:       11/1/2023     9:45 AM   Breast CA Risk Assessment (FHS-7)   Did any of your first-degree relatives have breast or ovarian cancer? Yes   Did any of your relatives have bilateral breast cancer? No   Did any man in your family have breast cancer? No   Did any woman in your family have breast and ovarian cancer? Yes   Did any woman in your family have breast cancer before age 50 y? No   Do you  "have 2 or more relatives with breast and/or ovarian cancer? Unknown   Do you have 2 or more relatives with breast and/or bowel cancer? Unknown     Mom had the BRCA testing which was negative.     Patient under 40 years of age: Routine Mammogram Screening not recommended.   Pertinent mammograms are reviewed under the imaging tab.    History of abnormal Pap smear: NO - age 21-29 PAP every 3 years recommended      7/6/2020     8:54 AM   PAP / HPV   PAP (Historical) NIL      Reviewed and updated as needed this visit by clinical staff   Tobacco  Allergies  Meds              Reviewed and updated as needed this visit by Provider                     Review of Systems   Constitutional:  Negative for chills and fever.   HENT:  Negative for congestion, ear pain, hearing loss and sore throat.    Eyes:  Negative for pain and visual disturbance.   Respiratory:  Negative for cough and shortness of breath.    Cardiovascular:  Negative for chest pain, palpitations and peripheral edema.   Gastrointestinal:  Negative for abdominal pain, constipation, diarrhea, heartburn, hematochezia and nausea.   Breasts:  Negative for tenderness, breast mass and discharge.   Genitourinary:  Negative for dysuria, frequency, genital sores, hematuria, pelvic pain, urgency, vaginal bleeding and vaginal discharge.   Musculoskeletal:  Negative for arthralgias, joint swelling and myalgias.   Neurological:  Negative for dizziness, weakness, headaches and paresthesias.   Psychiatric/Behavioral:  Negative for mood changes. The patient is not nervous/anxious.           OBJECTIVE:   /83   Pulse 94   Temp 98.8  F (37.1  C) (Tympanic)   Resp 22   Ht 1.549 m (5' 1\")   Wt 47.6 kg (105 lb)   LMP  (LMP Unknown)   SpO2 100%   BMI 19.84 kg/m    Physical Exam  Exam conducted with a chaperone present.   Constitutional:       Appearance: Normal appearance. She is not ill-appearing.   HENT:      Right Ear: Tympanic membrane, ear canal and external ear " normal.      Left Ear: Tympanic membrane, ear canal and external ear normal.      Nose: Nose normal. No congestion.      Mouth/Throat:      Mouth: Mucous membranes are moist.      Pharynx: Oropharynx is clear.   Eyes:      Pupils: Pupils are equal, round, and reactive to light.   Cardiovascular:      Rate and Rhythm: Normal rate and regular rhythm.      Pulses: Normal pulses.      Heart sounds: Normal heart sounds. No murmur heard.     No friction rub. No gallop.   Pulmonary:      Effort: Pulmonary effort is normal.      Breath sounds: Normal breath sounds.   Chest:   Breasts:     Right: Normal.      Left: Normal.   Abdominal:      General: Abdomen is flat. Bowel sounds are normal.      Palpations: Abdomen is soft.   Genitourinary:     General: Normal vulva.      Labia:         Right: No rash, tenderness, lesion or injury.         Left: No rash, tenderness, lesion or injury.       Vagina: Normal. No vaginal discharge.      Cervix: Normal.      Uterus: Normal.       Adnexa: Right adnexa normal and left adnexa normal.   Musculoskeletal:         General: Normal range of motion.      Cervical back: Normal range of motion.   Lymphadenopathy:      Cervical: No cervical adenopathy.   Skin:     General: Skin is warm and dry.   Neurological:      General: No focal deficit present.      Mental Status: She is alert and oriented to person, place, and time.   Psychiatric:         Mood and Affect: Mood normal.         Behavior: Behavior normal.         Thought Content: Thought content normal.         Judgment: Judgment normal.               ASSESSMENT/PLAN:       ICD-10-CM    1. Routine general medical examination at a health care facility  Z00.00       2. Encounter for surveillance of contraceptive pills  Z30.41 norethindrone-ethinyl estradiol (JUNEL FE 1/20) 1-20 MG-MCG tablet      3. Screening for malignant neoplasm of cervix  Z12.4 Pap Screen reflex to HPV if ASCUS - recommend age 25 - 29                COUNSELING:  Reviewed  preventive health counseling, as reflected in patient instructions       Regular exercise       Healthy diet/nutrition       Contraception       Family planning       Osteoporosis prevention/bone health       Safe sex practices/STD prevention       Skin cancer prevention        She reports that she has never smoked. She has never used smokeless tobacco.          ALEJANDRO Burris CNP  [facility]

## 2023-11-06 LAB
BKR LAB AP GYN ADEQUACY: NORMAL
BKR LAB AP GYN INTERPRETATION: NORMAL
BKR LAB AP HPV REFLEX: NORMAL
BKR LAB AP PREVIOUS ABNORMAL: NORMAL
PATH REPORT.COMMENTS IMP SPEC: NORMAL
PATH REPORT.COMMENTS IMP SPEC: NORMAL
PATH REPORT.RELEVANT HX SPEC: NORMAL

## 2024-09-27 ENCOUNTER — MYC REFILL (OUTPATIENT)
Dept: FAMILY MEDICINE | Facility: CLINIC | Age: 24
End: 2024-09-27
Payer: COMMERCIAL

## 2024-09-27 DIAGNOSIS — Z30.41 ENCOUNTER FOR SURVEILLANCE OF CONTRACEPTIVE PILLS: ICD-10-CM

## 2024-09-30 RX ORDER — NORETHINDRONE ACETATE AND ETHINYL ESTRADIOL 1MG-20(21)
1 KIT ORAL DAILY
Qty: 84 TABLET | Refills: 0 | Status: SHIPPED | OUTPATIENT
Start: 2024-09-30 | End: 2024-10-01

## 2024-10-01 DIAGNOSIS — Z30.41 ENCOUNTER FOR SURVEILLANCE OF CONTRACEPTIVE PILLS: ICD-10-CM

## 2024-10-01 RX ORDER — NORETHINDRONE ACETATE AND ETHINYL ESTRADIOL AND FERROUS FUMARATE 1MG-20(21)
1 KIT ORAL DAILY
Qty: 84 TABLET | Refills: 0 | Status: SHIPPED | OUTPATIENT
Start: 2024-10-01

## 2024-10-16 SDOH — HEALTH STABILITY: PHYSICAL HEALTH: ON AVERAGE, HOW MANY DAYS PER WEEK DO YOU ENGAGE IN MODERATE TO STRENUOUS EXERCISE (LIKE A BRISK WALK)?: 7 DAYS

## 2024-10-16 SDOH — HEALTH STABILITY: PHYSICAL HEALTH: ON AVERAGE, HOW MANY MINUTES DO YOU ENGAGE IN EXERCISE AT THIS LEVEL?: 30 MIN

## 2024-10-16 ASSESSMENT — SOCIAL DETERMINANTS OF HEALTH (SDOH): HOW OFTEN DO YOU GET TOGETHER WITH FRIENDS OR RELATIVES?: ONCE A WEEK

## 2024-10-17 ENCOUNTER — OFFICE VISIT (OUTPATIENT)
Dept: FAMILY MEDICINE | Facility: CLINIC | Age: 24
End: 2024-10-17
Payer: COMMERCIAL

## 2024-10-17 VITALS
SYSTOLIC BLOOD PRESSURE: 108 MMHG | OXYGEN SATURATION: 97 % | HEIGHT: 61 IN | BODY MASS INDEX: 19.71 KG/M2 | HEART RATE: 86 BPM | TEMPERATURE: 98 F | DIASTOLIC BLOOD PRESSURE: 84 MMHG | WEIGHT: 104.4 LBS | RESPIRATION RATE: 20 BRPM

## 2024-10-17 DIAGNOSIS — Z30.41 ENCOUNTER FOR SURVEILLANCE OF CONTRACEPTIVE PILLS: ICD-10-CM

## 2024-10-17 DIAGNOSIS — Z00.00 ROUTINE GENERAL MEDICAL EXAMINATION AT A HEALTH CARE FACILITY: Primary | ICD-10-CM

## 2024-10-17 PROCEDURE — 99395 PREV VISIT EST AGE 18-39: CPT | Performed by: NURSE PRACTITIONER

## 2024-10-17 RX ORDER — NORETHINDRONE ACETATE AND ETHINYL ESTRADIOL 1MG-20(21)
1 KIT ORAL DAILY
Qty: 84 TABLET | Refills: 3 | Status: SHIPPED | OUTPATIENT
Start: 2024-10-17

## 2024-10-17 ASSESSMENT — PAIN SCALES - GENERAL: PAINLEVEL: NO PAIN (0)

## 2024-10-17 NOTE — PROGRESS NOTES
Preventive Care Visit  [facility]  ALEJANDRO Burris CNP, Family Medicine  Oct 17, 2024      Assessment & Plan     Routine general medical examination at a health care facility  -next preventative care visit in one year.     Encounter for surveillance of contraceptive pills  -no issues  - norethindrone-ethinyl estradiol (AUROVELA FE 1/20) 1-20 MG-MCG tablet; Take 1 tablet by mouth daily.        Counseling  Appropriate preventive services were addressed with this patient via screening, questionnaire, or discussion as appropriate for fall prevention, nutrition, physical activity, Tobacco-use cessation, social engagement, weight loss and cognition.  Checklist reviewing preventive services available has been given to the patient.  Reviewed patient's diet, addressing concerns and/or questions.           Charles Thayer is a 23 year old, presenting for the following:  Physical        10/17/2024     8:33 AM   Additional Questions   Roomed by Yael TALBERT   Accompanied by self        Health Care Directive  Patient does not have a Health Care Directive or Living Will: Discussed advance care planning with patient; however, patient declined at this time.    Patient here for yearly preventative care visit. In addition, they have the following concerns:    1. No concerns      Fasting visit        10/16/2024   General Health   How would you rate your overall physical health? Excellent   Feel stress (tense, anxious, or unable to sleep) To some extent      (!) STRESS CONCERN      10/16/2024   Nutrition   Three or more servings of calcium each day? Yes   Diet: Regular (no restrictions)   How many servings of fruit and vegetables per day? (!) 2-3   How many sweetened beverages each day? 0-1            10/16/2024   Exercise   Days per week of moderate/strenous exercise 7 days   Average minutes spent exercising at this level 30 min            10/16/2024   Social Factors   Frequency of gathering with friends  or relatives Once a week   Worry food won't last until get money to buy more No   Food not last or not have enough money for food? No   Do you have housing? (Housing is defined as stable permanent housing and does not include staying ouside in a car, in a tent, in an abandoned building, in an overnight shelter, or couch-surfing.) Yes   Are you worried about losing your housing? No   Lack of transportation? No   Unable to get utilities (heat,electricity)? No            10/16/2024   Dental   Dentist two times every year? Yes            10/16/2024   TB Screening   Were you born outside of the [place]? No            Today's PHQ-2 Score:       10/16/2024    12:50 PM   PHQ-2 ( 1999 Pfizer)   Q1: Little interest or pleasure in doing things 0   Q2: Feeling down, depressed or hopeless 0   PHQ-2 Score 0   Q1: Little interest or pleasure in doing things Not at all   Q2: Feeling down, depressed or hopeless Not at all   PHQ-2 Score 0           10/16/2024   Substance Use   Alcohol more than 3/day or more than 7/wk No   Do you use any other substances recreationally? No        Social History     Tobacco Use    Smoking status: Never    Smokeless tobacco: Never   Vaping Use    Vaping status: Never Used   Substance Use Topics    Alcohol use: Yes     Comment: Occasionally    Drug use: No           11/1/2023   LAST FHS-7 RESULTS   1st degree relative breast or ovarian cancer Yes   Any relative bilateral breast cancer No   Any male have breast cancer No   Any ONE woman have BOTH breast AND ovarian cancer Yes   Any woman with breast cancer before 50yrs No   2 or more relatives with breast AND/OR ovarian cancer Unknown   2 or more relatives with breast AND/OR bowel cancer Unknown        Mammogram Screening - Patient under 40 years of age: Routine Mammogram Screening not recommended.           10/16/2024   One time HIV Screening   Previous HIV test? No          10/16/2024   STI Screening   New sexual partner(s) since last STI/HIV test? No     "    History of abnormal Pap smear: No - age 21-29 PAP every 3 years recommended        11/2/2023     8:43 AM 7/6/2020     8:54 AM   PAP / HPV   PAP Negative for Intraepithelial Lesion or Malignancy (NILM)     PAP (Historical)  NIL            10/16/2024   Contraception/Family Planning   Questions about contraception or family planning No          Reviewed and updated as needed this visit by Provider   Tobacco  Allergies  Meds  Problems  Med Hx  Surg Hx  Fam Hx  Soc   Hx Sexual Activity            Objective    Exam  /84   Pulse 86   Temp 98  F (36.7  C) (Tympanic)   Resp 20   Ht 1.549 m (5' 1\")   Wt 47.4 kg (104 lb 6.4 oz)   SpO2 97%   BMI 19.73 kg/m     Estimated body mass index is 19.73 kg/m  as calculated from the following:    Height as of this encounter: 1.549 m (5' 1\").    Weight as of this encounter: 47.4 kg (104 lb 6.4 oz).    Physical Exam  Constitutional:       Appearance: Normal appearance. She is not ill-appearing.   HENT:      Right Ear: Tympanic membrane, ear canal and external ear normal.      Left Ear: Tympanic membrane, ear canal and external ear normal.      Nose: Nose normal. No congestion.      Mouth/Throat:      Mouth: Mucous membranes are moist.      Pharynx: Oropharynx is clear.   Eyes:      Pupils: Pupils are equal, round, and reactive to light.   Cardiovascular:      Rate and Rhythm: Normal rate and regular rhythm.      Pulses: Normal pulses.      Heart sounds: Normal heart sounds. No murmur heard.     No friction rub. No gallop.   Pulmonary:      Effort: Pulmonary effort is normal.      Breath sounds: Normal breath sounds.   Abdominal:      General: Abdomen is flat. Bowel sounds are normal.      Palpations: Abdomen is soft.   Musculoskeletal:         General: Normal range of motion.      Cervical back: Normal range of motion.   Lymphadenopathy:      Cervical: No cervical adenopathy.   Skin:     General: Skin is warm and dry.   Neurological:      General: No focal deficit " present.      Mental Status: She is alert and oriented to person, place, and time.   Psychiatric:         Mood and Affect: Mood normal.         Behavior: Behavior normal.         Thought Content: Thought content normal.         Judgment: Judgment normal.               Signed Electronically by: ALEJANDRO Burris CNP

## 2024-10-17 NOTE — PATIENT INSTRUCTIONS
Patient Education   Preventive Care Advice   This is general advice given by our system to help you stay healthy. However, your care team may have specific advice just for you. Please talk to your care team about your preventive care needs.  Nutrition  Eat 5 or more servings of fruits and vegetables each day.  Try wheat bread, brown rice and whole grain pasta (instead of white bread, rice, and pasta).  Get enough calcium and vitamin D. Check the label on foods and aim for 100% of the RDA (recommended daily allowance).  Lifestyle  Exercise at least 150 minutes each week  (30 minutes a day, 5 days a week).  Do muscle strengthening activities 2 days a week. These help control your weight and prevent disease.  No smoking.  Wear sunscreen to prevent skin cancer.  Have a dental exam and cleaning every 6 months.  Yearly exams  See your health care team every year to talk about:  Any changes in your health.  Any medicines your care team has prescribed.  Preventive care, family planning, and ways to prevent chronic diseases.  Shots (vaccines)   HPV shots (up to age 26), if you've never had them before.  Hepatitis B shots (up to age 59), if you've never had them before.  COVID-19 shot: Get this shot when it's due.  Flu shot: Get a flu shot every year.  Tetanus shot: Get a tetanus shot every 10 years.  Pneumococcal, hepatitis A, and RSV shots: Ask your care team if you need these based on your risk.  Shingles shot (for age 50 and up)  General health tests  Diabetes screening:  Starting at age 35, Get screened for diabetes at least every 3 years.  If you are younger than age 35, ask your care team if you should be screened for diabetes.  Cholesterol test: At age 39, start having a cholesterol test every 5 years, or more often if advised.  Bone density scan (DEXA): At age 50, ask your care team if you should have this scan for osteoporosis (brittle bones).  Hepatitis C: Get tested at least once in your life.  STIs (sexually  transmitted infections)  Before age 24: Ask your care team if you should be screened for STIs.  After age 24: Get screened for STIs if you're at risk. You are at risk for STIs (including HIV) if:  You are sexually active with more than one person.  You don't use condoms every time.  You or a partner was diagnosed with a sexually transmitted infection.  If you are at risk for HIV, ask about PrEP medicine to prevent HIV.  Get tested for HIV at least once in your life, whether you are at risk for HIV or not.  Cancer screening tests  Cervical cancer screening: If you have a cervix, begin getting regular cervical cancer screening tests starting at age 21.  Breast cancer scan (mammogram): If you've ever had breasts, begin having regular mammograms starting at age 40. This is a scan to check for breast cancer.  Colon cancer screening: It is important to start screening for colon cancer at age 45.  Have a colonoscopy test every 10 years (or more often if you're at risk) Or, ask your provider about stool tests like a FIT test every year or Cologuard test every 3 years.  To learn more about your testing options, visit:   .  For help making a decision, visit:   https://bit.ly/za86755.  Prostate cancer screening test: If you have a prostate, ask your care team if a prostate cancer screening test (PSA) at age 55 is right for you.  Lung cancer screening: If you are a current or former smoker ages 50 to 80, ask your care team if ongoing lung cancer screenings are right for you.  For informational purposes only. Not to replace the advice of your health care provider. Copyright   2023 [employer]. All rights reserved. Clinically reviewed by the [facility] Transitions Program. Feidee [id number] - REV 01/24.

## 2025-02-22 DIAGNOSIS — Z30.41 ENCOUNTER FOR SURVEILLANCE OF CONTRACEPTIVE PILLS: ICD-10-CM

## 2025-02-24 RX ORDER — NORETHINDRONE ACETATE AND ETHINYL ESTRADIOL AND FERROUS FUMARATE 1MG-20(21)
1 KIT ORAL DAILY
Qty: 84 TABLET | Refills: 1 | Status: SHIPPED | OUTPATIENT
Start: 2025-02-24

## 2025-02-24 NOTE — TELEPHONE ENCOUNTER
New pharmacy for this refill request. Remaining refills sent to this new pharmacy.    Cathie Clark BSN, RN